# Patient Record
Sex: MALE | Race: WHITE | NOT HISPANIC OR LATINO | URBAN - METROPOLITAN AREA
[De-identification: names, ages, dates, MRNs, and addresses within clinical notes are randomized per-mention and may not be internally consistent; named-entity substitution may affect disease eponyms.]

---

## 2022-03-27 ENCOUNTER — EMERGENCY (EMERGENCY)
Facility: HOSPITAL | Age: 27
LOS: 1 days | Discharge: HOME | End: 2022-03-27
Attending: EMERGENCY MEDICINE | Admitting: EMERGENCY MEDICINE
Payer: COMMERCIAL

## 2022-03-27 VITALS
SYSTOLIC BLOOD PRESSURE: 151 MMHG | OXYGEN SATURATION: 99 % | HEART RATE: 68 BPM | DIASTOLIC BLOOD PRESSURE: 94 MMHG | WEIGHT: 199.96 LBS | TEMPERATURE: 99 F | RESPIRATION RATE: 16 BRPM | HEIGHT: 74 IN

## 2022-03-27 PROCEDURE — 99285 EMERGENCY DEPT VISIT HI MDM: CPT

## 2022-03-27 NOTE — ED ADULT NURSE NOTE - CAS EDN DISCHARGE INTERVENTIONS
IV discontinued, cath removed intact W Plasty Text: The lesion was extirpated to the level of the fat with a #15 scalpel blade.  Given the location of the defect, shape of the defect and the proximity to free margins a W-plasty was deemed most appropriate for repair.  Using a sterile surgical marker, the appropriate transposition arms of the W-plasty were drawn incorporating the defect and placing the expected incisions within the relaxed skin tension lines where possible.    The area thus outlined was incised deep to adipose tissue with a #15 scalpel blade.  The skin margins were undermined to an appropriate distance in all directions utilizing iris scissors.  The opposing transposition arms were then transposed into place in opposite direction and anchored with interrupted buried subcutaneous sutures.

## 2022-03-28 VITALS
TEMPERATURE: 98 F | SYSTOLIC BLOOD PRESSURE: 142 MMHG | HEART RATE: 66 BPM | OXYGEN SATURATION: 99 % | DIASTOLIC BLOOD PRESSURE: 64 MMHG | RESPIRATION RATE: 16 BRPM

## 2022-03-28 LAB
ALBUMIN SERPL ELPH-MCNC: 5 G/DL — SIGNIFICANT CHANGE UP (ref 3.3–5)
ALP SERPL-CCNC: 66 U/L — SIGNIFICANT CHANGE UP (ref 40–120)
ALT FLD-CCNC: 20 U/L — SIGNIFICANT CHANGE UP (ref 10–45)
ANION GAP SERPL CALC-SCNC: 12 MMOL/L — SIGNIFICANT CHANGE UP (ref 5–17)
APTT BLD: 35.3 SEC — SIGNIFICANT CHANGE UP (ref 27.5–35.5)
AST SERPL-CCNC: 19 U/L — SIGNIFICANT CHANGE UP (ref 10–40)
BASOPHILS # BLD AUTO: 0.07 K/UL — SIGNIFICANT CHANGE UP (ref 0–0.2)
BASOPHILS NFR BLD AUTO: 0.7 % — SIGNIFICANT CHANGE UP (ref 0–2)
BILIRUB SERPL-MCNC: 0.5 MG/DL — SIGNIFICANT CHANGE UP (ref 0.2–1.2)
BUN SERPL-MCNC: 21 MG/DL — SIGNIFICANT CHANGE UP (ref 7–23)
CALCIUM SERPL-MCNC: 9.6 MG/DL — SIGNIFICANT CHANGE UP (ref 8.4–10.5)
CHLORIDE SERPL-SCNC: 99 MMOL/L — SIGNIFICANT CHANGE UP (ref 96–108)
CO2 SERPL-SCNC: 25 MMOL/L — SIGNIFICANT CHANGE UP (ref 22–31)
CREAT SERPL-MCNC: 1.22 MG/DL — SIGNIFICANT CHANGE UP (ref 0.5–1.3)
EGFR: 84 ML/MIN/1.73M2 — SIGNIFICANT CHANGE UP
EOSINOPHIL # BLD AUTO: 0.14 K/UL — SIGNIFICANT CHANGE UP (ref 0–0.5)
EOSINOPHIL NFR BLD AUTO: 1.4 % — SIGNIFICANT CHANGE UP (ref 0–6)
GLUCOSE SERPL-MCNC: 82 MG/DL — SIGNIFICANT CHANGE UP (ref 70–99)
HCT VFR BLD CALC: 42.7 % — SIGNIFICANT CHANGE UP (ref 39–50)
HGB BLD-MCNC: 14.4 G/DL — SIGNIFICANT CHANGE UP (ref 13–17)
IMM GRANULOCYTES NFR BLD AUTO: 0.3 % — SIGNIFICANT CHANGE UP (ref 0–1.5)
INR BLD: 0.99 — SIGNIFICANT CHANGE UP (ref 0.88–1.16)
LYMPHOCYTES # BLD AUTO: 3.65 K/UL — HIGH (ref 1–3.3)
LYMPHOCYTES # BLD AUTO: 37.1 % — SIGNIFICANT CHANGE UP (ref 13–44)
MCHC RBC-ENTMCNC: 28.1 PG — SIGNIFICANT CHANGE UP (ref 27–34)
MCHC RBC-ENTMCNC: 33.7 GM/DL — SIGNIFICANT CHANGE UP (ref 32–36)
MCV RBC AUTO: 83.4 FL — SIGNIFICANT CHANGE UP (ref 80–100)
MONOCYTES # BLD AUTO: 0.99 K/UL — HIGH (ref 0–0.9)
MONOCYTES NFR BLD AUTO: 10.1 % — SIGNIFICANT CHANGE UP (ref 2–14)
NEUTROPHILS # BLD AUTO: 4.96 K/UL — SIGNIFICANT CHANGE UP (ref 1.8–7.4)
NEUTROPHILS NFR BLD AUTO: 50.4 % — SIGNIFICANT CHANGE UP (ref 43–77)
NRBC # BLD: 0 /100 WBCS — SIGNIFICANT CHANGE UP (ref 0–0)
PLATELET # BLD AUTO: 282 K/UL — SIGNIFICANT CHANGE UP (ref 150–400)
POTASSIUM SERPL-MCNC: 3.9 MMOL/L — SIGNIFICANT CHANGE UP (ref 3.5–5.3)
POTASSIUM SERPL-SCNC: 3.9 MMOL/L — SIGNIFICANT CHANGE UP (ref 3.5–5.3)
PROT SERPL-MCNC: 7.7 G/DL — SIGNIFICANT CHANGE UP (ref 6–8.3)
PROTHROM AB SERPL-ACNC: 11.8 SEC — SIGNIFICANT CHANGE UP (ref 10.5–13.4)
RBC # BLD: 5.12 M/UL — SIGNIFICANT CHANGE UP (ref 4.2–5.8)
RBC # FLD: 13 % — SIGNIFICANT CHANGE UP (ref 10.3–14.5)
SARS-COV-2 RNA SPEC QL NAA+PROBE: NEGATIVE — SIGNIFICANT CHANGE UP
SODIUM SERPL-SCNC: 136 MMOL/L — SIGNIFICANT CHANGE UP (ref 135–145)
WBC # BLD: 9.84 K/UL — SIGNIFICANT CHANGE UP (ref 3.8–10.5)
WBC # FLD AUTO: 9.84 K/UL — SIGNIFICANT CHANGE UP (ref 3.8–10.5)

## 2022-03-28 PROCEDURE — 96374 THER/PROPH/DIAG INJ IV PUSH: CPT | Mod: XU

## 2022-03-28 PROCEDURE — 70490 CT SOFT TISSUE NECK W/O DYE: CPT | Mod: MA

## 2022-03-28 PROCEDURE — 96375 TX/PRO/DX INJ NEW DRUG ADDON: CPT | Mod: XU

## 2022-03-28 PROCEDURE — 85610 PROTHROMBIN TIME: CPT

## 2022-03-28 PROCEDURE — 87635 SARS-COV-2 COVID-19 AMP PRB: CPT

## 2022-03-28 PROCEDURE — 31575 DIAGNOSTIC LARYNGOSCOPY: CPT

## 2022-03-28 PROCEDURE — 85025 COMPLETE CBC W/AUTO DIFF WBC: CPT

## 2022-03-28 PROCEDURE — 36415 COLL VENOUS BLD VENIPUNCTURE: CPT

## 2022-03-28 PROCEDURE — 80053 COMPREHEN METABOLIC PANEL: CPT

## 2022-03-28 PROCEDURE — 85730 THROMBOPLASTIN TIME PARTIAL: CPT

## 2022-03-28 PROCEDURE — 99284 EMERGENCY DEPT VISIT MOD MDM: CPT | Mod: 25

## 2022-03-28 PROCEDURE — 70490 CT SOFT TISSUE NECK W/O DYE: CPT | Mod: 26,MA

## 2022-03-28 RX ORDER — TETRACAINE/BENZOCAINE/BUTAMBEN 2%-14%-2%
1 OINTMENT (GRAM) TOPICAL ONCE
Refills: 0 | Status: COMPLETED | OUTPATIENT
Start: 2022-03-28 | End: 2022-03-28

## 2022-03-28 RX ORDER — LIDOCAINE 4 G/100G
10 CREAM TOPICAL ONCE
Refills: 0 | Status: COMPLETED | OUTPATIENT
Start: 2022-03-28 | End: 2022-03-28

## 2022-03-28 RX ORDER — KETOROLAC TROMETHAMINE 30 MG/ML
15 SYRINGE (ML) INJECTION ONCE
Refills: 0 | Status: DISCONTINUED | OUTPATIENT
Start: 2022-03-28 | End: 2022-03-28

## 2022-03-28 RX ORDER — ACETAMINOPHEN 500 MG
1000 TABLET ORAL ONCE
Refills: 0 | Status: COMPLETED | OUTPATIENT
Start: 2022-03-28 | End: 2022-03-28

## 2022-03-28 RX ADMIN — Medication 30 MILLILITER(S): at 01:23

## 2022-03-28 RX ADMIN — Medication 15 MILLIGRAM(S): at 01:23

## 2022-03-28 RX ADMIN — Medication 400 MILLIGRAM(S): at 02:11

## 2022-03-28 RX ADMIN — Medication 1 SPRAY(S): at 02:12

## 2022-03-28 RX ADMIN — LIDOCAINE 10 MILLILITER(S): 4 CREAM TOPICAL at 01:23

## 2022-03-28 RX ADMIN — Medication 15 MILLIGRAM(S): at 01:59

## 2022-03-28 NOTE — ED PROVIDER NOTE - PHYSICAL EXAMINATION
VITAL SIGNS: I have reviewed nursing notes and confirm.  CONSTITUTIONAL: Well-developed; well-nourished; in no acute distress.  SKIN: Agree with RN documentation regarding decubitus evaluation. Remainder of skin exam is warm and dry, no acute rash.  HEAD: Normocephalic; atraumatic.  EYES: PERRL, EOM intact; conjunctiva and sclera clear.  ENT: No nasal discharge; airway clear. Slight fullness on right side of neck. Palpable swollen submandibular lymph node that is tender to palpation. No stridor. Able to tolerate secretions.   NECK: Supple; non tender.  CARD: S1, S2 normal; no murmurs, gallops, or rubs. Regular rate and rhythm.  RESP: No wheezes, rales or rhonchi.  ABD: Normal bowel sounds; soft; non-distended; non-tender; no hepatosplenomegaly.  EXT: Normal ROM. No clubbing, cyanosis or edema.  LYMPH: No acute cervical adenopathy.  NEURO: Alert, oriented. Grossly unremarkable.  PSYCH: Cooperative, appropriate.

## 2022-03-28 NOTE — ED PROVIDER NOTE - OBJECTIVE STATEMENT
27 y/o M otherwise healthy presents to ED with c/o worsening throat pain for the past 5 days. Pain is worsened with swallowing and is described as a stinging sensation. Pt states 2 weeks ago he swallowed a lemon seed and it got stuck in his throat. It is unclear if it dislodged. Pain is worse around the area where the seed was stuck. Denies any fevers, cough, or voice changes.

## 2022-03-28 NOTE — ED PROVIDER NOTE - PATIENT PORTAL LINK FT
You can access the FollowMyHealth Patient Portal offered by [facility] by registering at the following website: [email or web address]. By joining [facility]’s FollowMyHealth portal, you will also be able to view your health information using other applications (apps) compatible with our system.

## 2022-03-28 NOTE — CONSULT NOTE ADULT - SUBJECTIVE AND OBJECTIVE BOX
HPI: 26y Male otherwise healthy presents to ED with c/o worsening throat pain for the past 5 days. Pain is worsened with swallowing and is described as a stinging sensation localized to R submandibular region where the seed was stuck. Pt states 2 weeks ago he swallowed a lemon seed and it got stuck in his throat. It is unclear if it dislodged. Denies any fevers, cough, or voice changes. Tolerating PO.     Allergies    No Known Allergies    Intolerances        PAST MEDICAL & SURGICAL HISTORY:  No pertinent past medical history        MEDICATIONS:  Antiinfectives:     Hematologic/Anticoagulation:    Pain medications/Neuro:    IV fluids:    Endocrine Medications:     All other standing medications:     All other PRN medications:      SOCIAL HISTORY:  Tobacco History:  ETOH Use:   Drug Use:     FAMILY HISTORY:      REVIEW OF SYSTEMS:     LABS:  CBC-                        14.4   9.84  )-----------( 282      ( 28 Mar 2022 01:07 )             42.7         03-28    136  |  99  |  21  ----------------------------<  82  3.9   |  25  |  1.22    Ca    9.6      28 Mar 2022 01:07    TPro  7.7  /  Alb  5.0  /  TBili  0.5  /  DBili  x   /  AST  19  /  ALT  20  /  AlkPhos  66  03-28    Coagulation Studies-  PT/INR - ( 28 Mar 2022 01:07 )   PT: 11.8 sec;   INR: 0.99          PTT - ( 28 Mar 2022 01:07 )  PTT:35.3 sec  Endocrine Panel-  --  --  9.6 mg/dL      Vital Signs Last 24 Hrs  T(C): 37.1 (27 Mar 2022 22:54), Max: 37.1 (27 Mar 2022 22:54)  T(F): 98.7 (27 Mar 2022 22:54), Max: 98.7 (27 Mar 2022 22:54)  HR: 68 (27 Mar 2022 22:54) (68 - 68)  BP: 151/94 (27 Mar 2022 22:54) (151/94 - 151/94)  BP(mean): --  RR: 16 (27 Mar 2022 22:54) (16 - 16)  SpO2: 99% (27 Mar 2022 22:54) (99% - 99%)  PHYSICAL EXAM:  ENT EXAM-   Constitutional: Well-developed, well-nourished.  No hoarseness.     Head:  normocephalic, atraumatic.   OC/OP:  Floor of mouth, buccal mucosa, lips, hard palate, soft palate, uvula elongated but wnl, posterior pharyngeal wall normal.  Mucosa moist.  Neck:  Trachea midline. TTP R submandibular region  MULTISYSTEM EXAM-  Neuro/Psych:  A&O x 3.  Mood stable.    Cranial nerves: 2-12 grossly intact bilaterally.  Eyes:  EOMI, no nystagmus.  Pulm:  No dyspnea, non-labored breathing  Skin:  No rash or lesions on exposed skin of head/neck    LARYNGOSCOPY EXAM:     Verbal consent was obtained from patient prior to procedure.    Indication: r/o airway FB    Anesthesia: Afrin spray was applied to the nasal cavities.    Flexible laryngoscopy was performed and revealed the following:    Nasopharynx had no mass or exudate, adenoid hypertrophy    Base of tongue was symmetric and not enlarged.    Vallecula was clear    Epiglottis, both aryepiglottic folds and both false vocal folds were normal    Arytenoids both without edema and erythema     True vocal folds were fully mobile and without lesions.     Post cricoid area was clear    Interarytenoid edema was absent    The patient tolerated the procedure well.      RADIOLOGY & ADDITIONAL STUDIES: pending read      A/P:  26y Male no PMH p/w 5d throat discomfort 2 wks after swallowing lemon seed. FOE unremarkable. Sx likely 2/2 viral pharyngitis vs residual globus sensation.  - f/u CT scan  - no acute ENT intervention at this time      Thank you for the consult, please page ENT at [phone] with any questions/concerns.  ----------------------------------------------------    Sintia Arias MD  Department of Otolaryngology - Head and Neck Surgery  [facility]

## 2022-03-28 NOTE — ED PROVIDER NOTE - CLINICAL SUMMARY MEDICAL DECISION MAKING FREE TEXT BOX
25 y/o M with throat pain and history of possible foreign body in throat (lemon seed) 2 weeks ago, unclear if dislodged. Consider viral infection with lymphadenopathy. Consider retained foreign object. Consider abscess. Plan for labs and CT neck. Will discuss with ENT for possible scope. Dispo pending workup, reevaluation, and consultation. 27 y/o M with throat pain and history of possible foreign body in throat (lemon seed) 2 weeks ago, unclear if dislodged. Consider viral infection with lymphadenopathy. doubt strep as no fever or exudate, Consider retained foreign object. Consider abscess. Plan for labs and CT neck. Will discuss with ENT for possible scope. Dispo pending workup, reevaluation, and consultation.    ENT to see

## 2022-03-28 NOTE — ED PROVIDER NOTE - NSFOLLOWUPINSTRUCTIONS_ED_ALL_ED_FT
f/u with and ENT if does not resolve within 1-2 weeks    Pharyngitis    Pharyngitis is inflammation of your pharynx, which is typically caused by a viral or bacterial infection. Pharyngitis can be contagious and may spread from person to person through intimate contact, coughing, sneezing, or sharing personal items and utensils. Symptoms of pharyngitis may include sore throat, fever, headache, or swollen lymph nodes. If you are prescribed antibiotics, make sure you finish them even if you start to feel better. Gargle with salt water as often as every 1-2 hours to soothe your throat. Throat lozenges (if you are not at risk for choking) or sprays may be used to soothe your throat.    SEEK IMMEDIATE MEDICAL CARE IF YOU HAVE ANY OF THE FOLLOWING SYMPTOMS: neck stiffness, drooling, hoarseness or change in voice, inability to swallow liquids, vomiting, or trouble breathing.

## 2022-03-29 DIAGNOSIS — J02.9 ACUTE PHARYNGITIS, UNSPECIFIED: ICD-10-CM

## 2022-03-29 DIAGNOSIS — R07.0 PAIN IN THROAT: ICD-10-CM

## 2022-03-29 DIAGNOSIS — Z20.822 CONTACT WITH AND (SUSPECTED) EXPOSURE TO COVID-19: ICD-10-CM
